# Patient Record
Sex: MALE | Race: BLACK OR AFRICAN AMERICAN
[De-identification: names, ages, dates, MRNs, and addresses within clinical notes are randomized per-mention and may not be internally consistent; named-entity substitution may affect disease eponyms.]

---

## 2018-03-27 ENCOUNTER — HOSPITAL ENCOUNTER (OUTPATIENT)
Dept: HOSPITAL 62 - END | Age: 68
End: 2018-03-27
Attending: INTERNAL MEDICINE
Payer: MEDICARE

## 2018-03-27 DIAGNOSIS — R13.10: Primary | ICD-10-CM

## 2018-03-27 PROCEDURE — 4A0B7BZ MEASUREMENT OF GASTROINTESTINAL PRESSURE, VIA NATURAL OR ARTIFICIAL OPENING: ICD-10-PCS | Performed by: INTERNAL MEDICINE

## 2018-03-27 PROCEDURE — 91010 ESOPHAGUS MOTILITY STUDY: CPT

## 2018-08-06 NOTE — EKG REPORT
SEVERITY:- ABNORMAL ECG -

SINUS RHYTHM

LEFT VENTRICULAR HYPERTROPHY

:

Confirmed by: Marissa Denny MD 06-Aug-2018 22:27:54

## 2018-08-10 ENCOUNTER — HOSPITAL ENCOUNTER (OUTPATIENT)
Dept: HOSPITAL 62 - SC | Age: 68
Discharge: HOME | End: 2018-08-10
Attending: OTOLARYNGOLOGY
Payer: MEDICARE

## 2018-08-10 DIAGNOSIS — Z79.51: ICD-10-CM

## 2018-08-10 DIAGNOSIS — Z79.82: ICD-10-CM

## 2018-08-10 DIAGNOSIS — E11.9: ICD-10-CM

## 2018-08-10 DIAGNOSIS — Z79.84: ICD-10-CM

## 2018-08-10 DIAGNOSIS — J34.2: ICD-10-CM

## 2018-08-10 DIAGNOSIS — J34.3: ICD-10-CM

## 2018-08-10 DIAGNOSIS — G47.33: Primary | ICD-10-CM

## 2018-08-10 DIAGNOSIS — M10.9: ICD-10-CM

## 2018-08-10 DIAGNOSIS — R06.09: ICD-10-CM

## 2018-08-10 DIAGNOSIS — K21.9: ICD-10-CM

## 2018-08-10 DIAGNOSIS — J45.909: ICD-10-CM

## 2018-08-10 DIAGNOSIS — Z79.899: ICD-10-CM

## 2018-08-10 DIAGNOSIS — M19.90: ICD-10-CM

## 2018-08-10 PROCEDURE — 82962 GLUCOSE BLOOD TEST: CPT

## 2018-08-10 PROCEDURE — 30140 RESECT INFERIOR TURBINATE: CPT

## 2018-08-10 PROCEDURE — 93010 ELECTROCARDIOGRAM REPORT: CPT

## 2018-08-10 PROCEDURE — 93005 ELECTROCARDIOGRAM TRACING: CPT

## 2018-08-10 PROCEDURE — 30520 REPAIR OF NASAL SEPTUM: CPT

## 2018-08-13 NOTE — [facility] OPERATIVE REPORT E
[facility]



NAME: MARIAH JARA

                                      MRN: [id number]

                                      AGE: 68Y

DATE OF SURGERY: 08/10/2018          ROOM:



PREOPERATIVE DIAGNOSES:

1.  Nasal septal deviation, acquired.

2.  Chronic nasal dyspnea.

3.  Bilateral inferior turbinate hypertrophy.

 

POSTOPERATIVE DIAGNOSES:

1.  Nasal septal deviation, acquired.

2.  Chronic nasal dyspnea.

3.  Bilateral inferior turbinate hypertrophy.



OPERATION PERFORMED:

1.  Septoplasty.

2.  Bilateral inferior turbinate reduction using a submucous resection

technique.



SURGEON:

MARIAH IBANEZ D.O.



ANESTHESIA:

General endotracheal tube.



ANESTHESIA STAFF:

DAMION Tabares



ESTIMATED BLOOD LOSS:

50 mL.



FLUIDS:

1250 mL.



COMPLICATIONS:

None.



DRAINS:

None.



SPONGE COUNT:

Verified



NEEDLE COUNT:

Verified.



MATERIALS FORWARDED AS SPECIMEN:

None.



FINDINGS:

1.  Right nasal septal deviation involving bone and cartilage with caudal

septal cartilage fractures and bowing deformity.

2.  Maxillary crest spur/septal spur.

3.  Bilateral inferior turbinate hypertrophy.

4.  Bulbous nasal tip that is heavy and ptotic in nature.



INDICATIONS:

This is a 68-year-old -American male who was seen and evaluated in

the [facility] office.  The patient had been referred for and he

complained of difficult nasal airflow over the years.  The patient reports

a history of nasal trauma years ago.  The patient denies history of acute

recurrent or chronic sinusitis-type symptoms.  The patient has desired to

improve his nasal airflow.  After extensive discussion with the patient,

recommendation and plan was for septoplasty with bilateral inferior

turbinate reduction.  The procedures and all of their risks and

complications were all discussed in detail with the patient.  He voiced an

understanding of the described surgical plan, agreed to proceed, and

consent was obtained.



PROCEDURE:

The patient was taken to the main operating room and placed on the

operating room table in the supine position.  Appropriate monitors were

placed.  Using mask and IV access, general anesthesia was induced.  The

patient was transorally intubated without difficulty.  Patient next

underwent a nasal examination with injection of local anesthetic with

epinephrine to establish a nasal block.  Two Afrin-soaked neuro patties

were placed per nasal passage.  The patient was then prepped and draped in

the usual fashion for nasal surgery.  There was a hemitransfixion incision

performed with elevation of the mucoperichondrial and periosteal flaps

without difficulty.  The bony cartilaginous junction was identified and

divided with the most deviated portions of septal cartilage and bone

removed.  There was a greater than 1.5 x 1.5 cm cartilaginous L-strut

preserved.  There were caudal septal cartilage fragments with bowing

deformity noted.  The septal spur and maxillary crest spur were removed

without difficulty.  At this point a large caudal septal graft was

fashioned and set in place on the right side and was fixed in place with

5-0 Prolene suture.  The septum had also been freed from the anterior nasal

spine.  At this point the septum with graft was fixed again in the midline

at the anterior nasal spine with 5-0 Prolene suture.  The hemitransfixion

incision was transitioned into a full transfixion incision.  There was a

precise pocket created between the medial crura/medial crural footplate.  A

5-0 Prolene suture was used to perform a Wonderbra suture for tip

stabilization and support.  At this point the remaining cartilage was

placed back between the mucosal flaps to be banked.  The nose was

thoroughly suctioned with adequate hemostasis noted.  Next, 1 Regalado

silicone nasal splint was placed per nasal passage and these were secured

at the caudal aspect with Prolene suture.  At this point the patient's nose

was cleaned and dried and he was returned to the anesthesia staff and

allowed to emerge from general anesthesia.  The patient was extubated in

the main operating room and was then transported to the postanesthesia

recovery unit in stable condition.  There were no complications.





DICTATING PHYSICIAN: MARIAH IBANEZ D.O.



[id number]              DT: 08/13/2018 0854

PHY#: [id number]         DD: 08/12/2018 2259

ID:   [id number]               JOB#: [id number]       ACCT: [id number]



cc:MARIAH IBANEZ D.O.

>